# Patient Record
Sex: MALE | Race: WHITE | NOT HISPANIC OR LATINO | URBAN - METROPOLITAN AREA
[De-identification: names, ages, dates, MRNs, and addresses within clinical notes are randomized per-mention and may not be internally consistent; named-entity substitution may affect disease eponyms.]

---

## 2023-03-09 PROBLEM — E11.65 TYPE 2 DIABETES MELLITUS WITH HYPERGLYCEMIA, WITH LONG-TERM CURRENT USE OF INSULIN (MULTI): Status: ACTIVE | Noted: 2023-03-09

## 2023-03-09 PROBLEM — S12.9XXD COMPRESSION FRACTURE OF CERVICAL SPINE WITH ROUTINE HEALING: Status: ACTIVE | Noted: 2023-03-09

## 2023-03-09 PROBLEM — Z86.39 HISTORY OF UNCONTROLLED DIABETES: Status: ACTIVE | Noted: 2023-03-09

## 2023-03-09 PROBLEM — E83.52 HYPERCALCEMIA: Status: ACTIVE | Noted: 2023-03-09

## 2023-03-09 PROBLEM — F10.20 ALCOHOLISM (MULTI): Status: ACTIVE | Noted: 2023-03-09

## 2023-03-09 PROBLEM — R51.9 CEPHALGIA: Status: ACTIVE | Noted: 2023-03-09

## 2023-03-09 PROBLEM — F41.9 ANXIETY: Status: ACTIVE | Noted: 2023-03-09

## 2023-03-09 PROBLEM — E21.3 HYPERPARATHYROIDISM (MULTI): Status: ACTIVE | Noted: 2023-03-09

## 2023-03-09 PROBLEM — Z79.4 TYPE 2 DIABETES MELLITUS WITH HYPERGLYCEMIA, WITH LONG-TERM CURRENT USE OF INSULIN (MULTI): Status: ACTIVE | Noted: 2023-03-09

## 2023-03-09 PROBLEM — K70.10: Status: ACTIVE | Noted: 2023-03-09

## 2023-03-09 PROBLEM — S22.000D COMPRESSION FRACTURE OF THORACIC VERTEBRA WITH ROUTINE HEALING: Status: ACTIVE | Noted: 2023-03-09

## 2023-03-09 PROBLEM — R00.2 PALPITATIONS: Status: ACTIVE | Noted: 2023-03-09

## 2023-03-09 RX ORDER — PEN NEEDLE, DIABETIC 30 GX3/16"
NEEDLE, DISPOSABLE MISCELLANEOUS
COMMUNITY
End: 2023-04-06 | Stop reason: SDUPTHER

## 2023-03-09 RX ORDER — INSULIN LISPRO 100 [IU]/ML
INJECTION, SOLUTION SUBCUTANEOUS
COMMUNITY
Start: 2021-01-28 | End: 2023-03-10 | Stop reason: SDUPTHER

## 2023-03-09 RX ORDER — CALCIUM CITRATE/VITAMIN D3 200MG-6.25
TABLET ORAL
COMMUNITY
Start: 2020-09-23

## 2023-03-09 RX ORDER — INSULIN GLARGINE 100 [IU]/ML
INJECTION, SOLUTION SUBCUTANEOUS
COMMUNITY
Start: 2020-09-17 | End: 2023-03-10 | Stop reason: SDUPTHER

## 2023-03-10 ENCOUNTER — TELEPHONE (OUTPATIENT)
Dept: PRIMARY CARE | Facility: CLINIC | Age: 28
End: 2023-03-10
Payer: COMMERCIAL

## 2023-03-10 DIAGNOSIS — E10.9 TYPE 1 DIABETES MELLITUS WITHOUT COMPLICATION (MULTI): Primary | ICD-10-CM

## 2023-03-10 RX ORDER — INSULIN LISPRO 100 [IU]/ML
7 INJECTION, SOLUTION SUBCUTANEOUS
Qty: 2 ML | Refills: 0 | Status: SHIPPED | OUTPATIENT
Start: 2023-03-10 | End: 2023-06-30 | Stop reason: SDUPTHER

## 2023-03-10 RX ORDER — INSULIN GLARGINE 100 [IU]/ML
30 INJECTION, SOLUTION SUBCUTANEOUS NIGHTLY
Qty: 2 ML | Refills: 0 | Status: SHIPPED | OUTPATIENT
Start: 2023-03-10 | End: 2023-06-30 | Stop reason: SDUPTHER

## 2023-03-10 NOTE — TELEPHONE ENCOUNTER
Refill(s) requested for:     1) Insulin Lispro   2) Lantus (100 unit/mL)    Pharmacy: [facility]  Pharmacy City: [place]     LR: 08/23/2022  LV: 09/10/2021  NV: 03/13/2023    Just as an FYI...the last time this pt called for a refill, he was told he needed an apt. He scheduled it, got his medication and no showed his apt on 02/06/2023

## 2023-03-13 ENCOUNTER — OFFICE VISIT (OUTPATIENT)
Dept: PRIMARY CARE | Facility: CLINIC | Age: 28
End: 2023-03-13
Payer: COMMERCIAL

## 2023-03-13 VITALS
WEIGHT: 155 LBS | SYSTOLIC BLOOD PRESSURE: 132 MMHG | DIASTOLIC BLOOD PRESSURE: 82 MMHG | TEMPERATURE: 95.9 F | HEIGHT: 70 IN | BODY MASS INDEX: 22.19 KG/M2

## 2023-03-13 DIAGNOSIS — Z79.4 TYPE 2 DIABETES MELLITUS WITH HYPERGLYCEMIA, WITH LONG-TERM CURRENT USE OF INSULIN (MULTI): Primary | ICD-10-CM

## 2023-03-13 DIAGNOSIS — K70.10: ICD-10-CM

## 2023-03-13 DIAGNOSIS — E11.65 TYPE 2 DIABETES MELLITUS WITH HYPERGLYCEMIA, WITH LONG-TERM CURRENT USE OF INSULIN (MULTI): Primary | ICD-10-CM

## 2023-03-13 LAB
ALANINE AMINOTRANSFERASE (SGPT) (U/L) IN SER/PLAS: 77 U/L (ref 10–52)
ALBUMIN (G/DL) IN SER/PLAS: 4.8 G/DL (ref 3.4–5)
ALKALINE PHOSPHATASE (U/L) IN SER/PLAS: 267 U/L (ref 33–120)
AMYLASE (U/L) IN SER/PLAS: 32 U/L (ref 29–103)
ANION GAP IN SER/PLAS: 13 MMOL/L (ref 10–20)
ASPARTATE AMINOTRANSFERASE (SGOT) (U/L) IN SER/PLAS: 45 U/L (ref 9–39)
BILIRUBIN TOTAL (MG/DL) IN SER/PLAS: 0.7 MG/DL (ref 0–1.2)
CALCIDIOL (25 OH VITAMIN D3) (NG/ML) IN SER/PLAS: <7 NG/ML
CALCIUM (MG/DL) IN SER/PLAS: 11.6 MG/DL (ref 8.6–10.3)
CARBON DIOXIDE, TOTAL (MMOL/L) IN SER/PLAS: 26 MMOL/L (ref 21–32)
CHLORIDE (MMOL/L) IN SER/PLAS: 97 MMOL/L (ref 98–107)
CHOLESTEROL (MG/DL) IN SER/PLAS: 127 MG/DL (ref 0–199)
CHOLESTEROL IN HDL (MG/DL) IN SER/PLAS: 47.3 MG/DL
CHOLESTEROL/HDL RATIO: 2.7
CREATININE (MG/DL) IN SER/PLAS: 0.84 MG/DL (ref 0.5–1.3)
ESTIMATED AVERAGE GLUCOSE FOR HBA1C: 289 MG/DL
GFR MALE: >90 ML/MIN/1.73M2
GLUCOSE (MG/DL) IN SER/PLAS: 410 MG/DL (ref 74–99)
HEMOGLOBIN A1C/HEMOGLOBIN TOTAL IN BLOOD: 11.7 %
LDL: 64 MG/DL (ref 0–99)
LIPASE (U/L) IN SER/PLAS: 10 U/L (ref 9–82)
POTASSIUM (MMOL/L) IN SER/PLAS: 4.4 MMOL/L (ref 3.5–5.3)
PROTEIN TOTAL: 7 G/DL (ref 6.4–8.2)
SODIUM (MMOL/L) IN SER/PLAS: 132 MMOL/L (ref 136–145)
TRIGLYCERIDE (MG/DL) IN SER/PLAS: 81 MG/DL (ref 0–149)
UREA NITROGEN (MG/DL) IN SER/PLAS: 9 MG/DL (ref 6–23)
VLDL: 16 MG/DL (ref 0–40)

## 2023-03-13 PROCEDURE — 83036 HEMOGLOBIN GLYCOSYLATED A1C: CPT

## 2023-03-13 PROCEDURE — 3079F DIAST BP 80-89 MM HG: CPT | Performed by: FAMILY MEDICINE

## 2023-03-13 PROCEDURE — 82150 ASSAY OF AMYLASE: CPT

## 2023-03-13 PROCEDURE — 82306 VITAMIN D 25 HYDROXY: CPT

## 2023-03-13 PROCEDURE — 80061 LIPID PANEL: CPT

## 2023-03-13 PROCEDURE — 83690 ASSAY OF LIPASE: CPT

## 2023-03-13 PROCEDURE — 3075F SYST BP GE 130 - 139MM HG: CPT | Performed by: FAMILY MEDICINE

## 2023-03-13 PROCEDURE — 99214 OFFICE O/P EST MOD 30 MIN: CPT | Performed by: FAMILY MEDICINE

## 2023-03-13 PROCEDURE — 80053 COMPREHEN METABOLIC PANEL: CPT

## 2023-03-13 ASSESSMENT — ENCOUNTER SYMPTOMS
COUGH: 0
HYPERACTIVE: 1
DYSPHORIC MOOD: 0
PALPITATIONS: 0
FEVER: 0
CONSTIPATION: 0
DIZZINESS: 0
DIARRHEA: 0
SHORTNESS OF BREATH: 0
CONFUSION: 0
NAUSEA: 0
ABDOMINAL PAIN: 0
BLOOD IN STOOL: 0
MYALGIAS: 0
HEMATURIA: 0
BACK PAIN: 0
NUMBNESS: 0
CHILLS: 0
ARTHRALGIAS: 0
LIGHT-HEADEDNESS: 0
FATIGUE: 0
SORE THROAT: 0
DEPRESSION: 0
WEAKNESS: 0
DYSURIA: 0
VOMITING: 0
HEADACHES: 0

## 2023-03-13 ASSESSMENT — PATIENT HEALTH QUESTIONNAIRE - PHQ9
1. LITTLE INTEREST OR PLEASURE IN DOING THINGS: NOT AT ALL
2. FEELING DOWN, DEPRESSED OR HOPELESS: NOT AT ALL
SUM OF ALL RESPONSES TO PHQ9 QUESTIONS 1 AND 2: 0

## 2023-03-13 NOTE — PATIENT INSTRUCTIONS
I will order a series of labs to evaluate your diabetes.  I recommend avoiding sugars and starches in the diet.  You state your diet could be a lot better than it is now.  I will order a referral to an endocrinologist to try to help get better control of your diabetes.  It is excellent that you completely stopped drinking a few years ago.  It is excellent that you have not smoked in the last six months.  I recommend completely avoiding smoking.  Return in one month for a recheck.

## 2023-03-13 NOTE — PROGRESS NOTES
"Subjective   Patient ID: [id number]     Paxton Hargrove is a 27 y.o. male who presents for Med Refill.  HPI    He is here for a recheck.  He has insulin dependent diabetes that has been poorly controlled in the past.  He has not been seen here since Fall 2021.  He has a history of alcohol abuse in the past.      He has a history of cervical and thoracic fractures at C6, C7, T2 and T3 in 2020.  He has a history of pancreatitis in 2020.    He is taking 18 units of Lantus each night and 7 units of lispro three times per day.  He is checking his sugars and getting readings around 250 on average.      He states he no longer drinks alcohol at all.  He has not drank at all since the last time he was in.  He states he was drinking for the anxiety and he no longer feels anxious.  He states he does not feel depressed at all.      Review of Systems   Constitutional:  Negative for chills, fatigue and fever.   HENT:  Negative for congestion and sore throat.    Eyes:  Negative for visual disturbance.   Respiratory:  Negative for cough and shortness of breath.    Cardiovascular:  Negative for chest pain, palpitations and leg swelling.   Gastrointestinal:  Negative for abdominal pain, blood in stool, constipation, diarrhea, nausea and vomiting.        Still has the oily stool when he eats too much fat, he states.   Genitourinary:  Negative for dysuria and hematuria.   Musculoskeletal:  Negative for arthralgias, back pain and myalgias.   Neurological:  Negative for dizziness, weakness, light-headedness, numbness and headaches.   Psychiatric/Behavioral:  Negative for behavioral problems, confusion, dysphoric mood, self-injury and suicidal ideas. The patient is hyperactive (a little bit. not as bad as it was.).       Objective     /82 (BP Location: Right arm, Patient Position: Sitting)   Temp 35.5 °C (95.9 °F)   Ht 1.772 m (5' 9.75\")   Wt 70.3 kg (155 lb)   BMI 22.40 kg/m²      Physical Exam  Constitutional:       Appearance: " Normal appearance. He is normal weight.   Cardiovascular:      Rate and Rhythm: Normal rate and regular rhythm.      Pulses: Normal pulses.      Heart sounds: Normal heart sounds. No murmur heard.  Pulmonary:      Effort: Pulmonary effort is normal.      Breath sounds: Normal breath sounds.   Abdominal:      General: Abdomen is flat.      Palpations: Abdomen is soft.      Tenderness: There is no abdominal tenderness. There is no guarding.   Skin:     Coloration: Skin is not jaundiced.   Neurological:      Mental Status: He is alert.   Psychiatric:         Mood and Affect: Mood normal.         Assessment/Plan   Problem List Items Addressed This Visit          Endocrine/Metabolic    Type 2 diabetes mellitus with hyperglycemia, with long-term current use of insulin (CMS/HCC) - Primary    Relevant Orders    Comprehensive metabolic panel    Lipid Panel    Amylase    Lipase    Hemoglobin A1C    Vitamin D 25-Hydroxy,Total    Referral to Endocrinology       Infectious/Inflammatory    Chronic alcoholic hepatitis    Relevant Orders    Comprehensive metabolic panel    Lipid Panel    Amylase    Lipase    Vitamin D 25-Hydroxy,Total   I will order a series of labs to evaluate your diabetes.  I recommend avoiding sugars and starches in the diet.  You state your diet could be a lot better than it is now.  I will order a referral to an endocrinologist to try to help get better control of your diabetes.  It is excellent that you completely stopped drinking a few years ago.  It is excellent that you have not smoked in the last six months.  I recommend completely avoiding smoking.  Return in one month for a recheck.      Casey Pacheco, DO

## 2023-03-14 ENCOUNTER — TELEPHONE (OUTPATIENT)
Dept: PRIMARY CARE | Facility: CLINIC | Age: 28
End: 2023-03-14
Payer: COMMERCIAL

## 2023-03-14 NOTE — TELEPHONE ENCOUNTER
Per -Patient notified that 5000 units daily of Vit D is appropriate to take.    Lab results discussed with patient.  Vitamin D level is low.  Patient is asking if Vitamin D 5000 units daily is too high of a dose to take?  He has 5000 unit pills currently at home that he would like to start if okay with you?  Please advise.  Thanks      ----- Message from Casey Pacheco DO sent at 3/14/2023  2:28 PM EDT -----  Please let him know his labs showed that his diabetes is not well controlled but it is better than it was two years ago.  I recommend that he continue his insulin and take it regularly.  I recommend that he strictly avoid sugars and starches from his diet.  Return as recommended in one month.

## 2023-04-06 DIAGNOSIS — E10.9 TYPE 1 DIABETES MELLITUS WITHOUT COMPLICATION (MULTI): Primary | ICD-10-CM

## 2023-04-06 RX ORDER — PEN NEEDLE, DIABETIC 30 GX3/16"
1 NEEDLE, DISPOSABLE MISCELLANEOUS 3 TIMES DAILY
Qty: 100 EACH | Refills: 2 | Status: SHIPPED | OUTPATIENT
Start: 2023-04-06 | End: 2023-07-31

## 2023-04-06 NOTE — TELEPHONE ENCOUNTER
Refill(s) requested for: Pen needles (31 G x 81 MM)    Pharmacy: [facility]  Pharmacy Address: [id number] [place] [address]    LR: 01/18/2023  LV: 03/13/2023  NV: None

## 2023-06-30 DIAGNOSIS — E10.9 TYPE 1 DIABETES MELLITUS WITHOUT COMPLICATION (MULTI): ICD-10-CM

## 2023-06-30 RX ORDER — INSULIN LISPRO 100 [IU]/ML
7 INJECTION, SOLUTION SUBCUTANEOUS
Qty: 2 ML | Refills: 0 | Status: SHIPPED | OUTPATIENT
Start: 2023-06-30 | End: 2023-10-31 | Stop reason: SDUPTHER

## 2023-06-30 RX ORDER — INSULIN GLARGINE 100 [IU]/ML
30 INJECTION, SOLUTION SUBCUTANEOUS NIGHTLY
Qty: 2 ML | Refills: 0 | Status: SHIPPED | OUTPATIENT
Start: 2023-06-30 | End: 2023-10-27 | Stop reason: SDUPTHER

## 2023-06-30 NOTE — TELEPHONE ENCOUNTER
Pt is requesting a refill on    Lantus 100 units 30 units nightly  LR 3/10/2023    Humalog 100 mg 7 units tid with meals  LR 3/10/2023     [facility]  [phone]    LV  3/10/2023  OLYAA

## 2023-07-30 DIAGNOSIS — E10.9 TYPE 1 DIABETES MELLITUS WITHOUT COMPLICATION (MULTI): ICD-10-CM

## 2023-07-31 ENCOUNTER — TELEPHONE (OUTPATIENT)
Dept: PRIMARY CARE | Facility: CLINIC | Age: 28
End: 2023-07-31
Payer: COMMERCIAL

## 2023-07-31 RX ORDER — PEN NEEDLE, DIABETIC 31 GX5/16"
NEEDLE, DISPOSABLE MISCELLANEOUS
Qty: 30 EACH | Refills: 2 | Status: SHIPPED | OUTPATIENT
Start: 2023-07-31 | End: 2023-08-07 | Stop reason: SDUPTHER

## 2023-07-31 NOTE — TELEPHONE ENCOUNTER
"THIS MESSAGE NEEDS TO BE SENT DIRECTLY TO     REFILL REQUEST    Med: Pen needle  Med Dose: 31 G x 5/16\"  Med Frequency: three times daily    [facility]  Pharmacy Address: [id number] [facility] [address] in [place]    LR: 04/06/2023  LV: 03/13/2023  NV: None    REFUSED to schedule apt. He was told at last apt (3/13) to follow up in a month, he did not. When I tried to get him an apt scheduled..he said it is his health, we should not have an incentive to try and get him to come in, he hates coming to these apts, and he does not feel he needs to be seen. He said all his meds/supplies should still be filled because he still has a medical condition. I explained that pts with diabetes need to come in frequently to check in on their health and he said that it is his health and he does not need to be seen frequently.   "

## 2023-08-07 DIAGNOSIS — E10.9 TYPE 1 DIABETES MELLITUS WITHOUT COMPLICATION (MULTI): ICD-10-CM

## 2023-08-07 RX ORDER — PEN NEEDLE, DIABETIC 30 GX3/16"
NEEDLE, DISPOSABLE MISCELLANEOUS
Qty: 90 EACH | Refills: 2 | Status: SHIPPED | OUTPATIENT
Start: 2023-08-07 | End: 2023-09-11 | Stop reason: SDUPTHER

## 2023-09-11 DIAGNOSIS — E10.9 TYPE 1 DIABETES MELLITUS WITHOUT COMPLICATION (MULTI): ICD-10-CM

## 2023-09-11 RX ORDER — PEN NEEDLE, DIABETIC 30 GX3/16"
NEEDLE, DISPOSABLE MISCELLANEOUS
Qty: 90 EACH | Refills: 2 | Status: SHIPPED | OUTPATIENT
Start: 2023-09-11 | End: 2023-10-31 | Stop reason: SDUPTHER

## 2023-09-11 NOTE — TELEPHONE ENCOUNTER
"Pt requesting refill    Pen needle 31 gauge x 5/16\" per chart use 1 in the morning, 1 in the afternoon, 1 before bedtime, per Pt use 4 times daily   [phone]   Last refill 8/7/23  Last appt 3/13/23  No future appt has been scheduled   "

## 2023-10-27 DIAGNOSIS — E10.9 TYPE 1 DIABETES MELLITUS WITHOUT COMPLICATION (MULTI): ICD-10-CM

## 2023-10-27 RX ORDER — INSULIN GLARGINE 100 [IU]/ML
30 INJECTION, SOLUTION SUBCUTANEOUS NIGHTLY
Qty: 10 ML | Refills: 0 | Status: SHIPPED | OUTPATIENT
Start: 2023-10-27 | End: 2023-10-31 | Stop reason: SDUPTHER

## 2023-10-27 NOTE — TELEPHONE ENCOUNTER
REFILL REQUEST    Med: Lantus  Med Dose: 100 unit/mL  Med Frequency: inject 30 units under the skin daily     [facility]  [facility]armacy Address: [id number] [facility] in [place]    LR: 06/30/2023  LV: 03/13/2023  NV: 10/31/2023

## 2023-10-31 ENCOUNTER — OFFICE VISIT (OUTPATIENT)
Dept: PRIMARY CARE | Facility: CLINIC | Age: 28
End: 2023-10-31
Payer: COMMERCIAL

## 2023-10-31 VITALS
SYSTOLIC BLOOD PRESSURE: 134 MMHG | TEMPERATURE: 97.6 F | BODY MASS INDEX: 21.68 KG/M2 | WEIGHT: 150 LBS | DIASTOLIC BLOOD PRESSURE: 80 MMHG

## 2023-10-31 DIAGNOSIS — E10.9 TYPE 1 DIABETES MELLITUS WITHOUT COMPLICATION (MULTI): Primary | ICD-10-CM

## 2023-10-31 LAB
POC FINGERSTICK BLOOD GLUCOSE: 455 MG/DL (ref 70–100)
POC HEMOGLOBIN A1C: 13.2 % (ref 4.2–6.5)

## 2023-10-31 PROCEDURE — 99214 OFFICE O/P EST MOD 30 MIN: CPT | Performed by: FAMILY MEDICINE

## 2023-10-31 PROCEDURE — 82962 GLUCOSE BLOOD TEST: CPT | Performed by: FAMILY MEDICINE

## 2023-10-31 PROCEDURE — 3079F DIAST BP 80-89 MM HG: CPT | Performed by: FAMILY MEDICINE

## 2023-10-31 PROCEDURE — 83036 HEMOGLOBIN GLYCOSYLATED A1C: CPT | Performed by: FAMILY MEDICINE

## 2023-10-31 PROCEDURE — 3046F HEMOGLOBIN A1C LEVEL >9.0%: CPT | Performed by: FAMILY MEDICINE

## 2023-10-31 PROCEDURE — 3075F SYST BP GE 130 - 139MM HG: CPT | Performed by: FAMILY MEDICINE

## 2023-10-31 RX ORDER — INSULIN LISPRO 100 [IU]/ML
7 INJECTION, SOLUTION SUBCUTANEOUS
Qty: 2 ML | Refills: 0 | Status: SHIPPED | OUTPATIENT
Start: 2023-10-31 | End: 2023-12-07 | Stop reason: SDUPTHER

## 2023-10-31 RX ORDER — INSULIN GLARGINE 100 [IU]/ML
INJECTION, SOLUTION SUBCUTANEOUS
Qty: 10 ML | Refills: 2 | Status: SHIPPED | OUTPATIENT
Start: 2023-10-31 | End: 2023-12-07 | Stop reason: SDUPTHER

## 2023-10-31 RX ORDER — PEN NEEDLE, DIABETIC 30 GX3/16"
NEEDLE, DISPOSABLE MISCELLANEOUS
Qty: 90 EACH | Refills: 2 | Status: SHIPPED | OUTPATIENT
Start: 2023-10-31 | End: 2023-12-07 | Stop reason: SDUPTHER

## 2023-10-31 ASSESSMENT — PATIENT HEALTH QUESTIONNAIRE - PHQ9
2. FEELING DOWN, DEPRESSED OR HOPELESS: NOT AT ALL
SUM OF ALL RESPONSES TO PHQ9 QUESTIONS 1 AND 2: 0
1. LITTLE INTEREST OR PLEASURE IN DOING THINGS: NOT AT ALL

## 2023-10-31 ASSESSMENT — ENCOUNTER SYMPTOMS: DEPRESSION: 0

## 2023-10-31 NOTE — PROGRESS NOTES
Subjective   Patient ID: [id number]     Paxton Hargrove is a 28 y.o. male who presents for Follow-up and Med Refill.  HPI  He is here for a recheck on diabetes.      He is on Lantus 30 units nightly and Humalog 7 units, three times per day with meals.  His last A1c was 11.7 in March 2023.    He is taking the insulin every day.  He states he does not checking his glucose.     No jitteriness, nausea, headache or dizziness.      No chest pain or sob.      He denies depression or anxiety.  No SI.      He does not smoke.  No alcohol.  No drug use.    He is not avoiding sugary foods.    Objective     /80 (BP Location: Right arm, Patient Position: Sitting)   Temp 36.4 °C (97.6 °F) (Skin)   Wt 68 kg (150 lb)   BMI 21.68 kg/m²      Physical Exam  Constitutional:       General: He is not in acute distress.     Appearance: Normal appearance.   Cardiovascular:      Rate and Rhythm: Normal rate and regular rhythm.      Heart sounds: Normal heart sounds.   Pulmonary:      Effort: Pulmonary effort is normal.      Breath sounds: Normal breath sounds.   Abdominal:      General: Abdomen is flat.      Palpations: Abdomen is soft.      Tenderness: There is no abdominal tenderness.   Neurological:      General: No focal deficit present.      Mental Status: He is alert and oriented to person, place, and time.      Sensory: No sensory deficit.      Coordination: Coordination normal.      Gait: Gait normal.   Psychiatric:         Mood and Affect: Mood normal.         Assessment/Plan   Problem List Items Addressed This Visit    None  Visit Diagnoses       Type 1 diabetes mellitus without complication (CMS/[facility])    -  Primary    Relevant Medications    insulin glargine (Lantus U-100 Insulin) 100 unit/mL injection    Other Relevant Orders    POCT Fingerstick Glucose manually resulted (Completed)    POCT glycosylated hemoglobin (Hb A1C) manually resulted (Completed)    Referral to Endocrinology          Your glucose is very poorly  controlled.  I will refer you to a diabetes specialist.  I recommend avoiding foods and drinks high in sugars and starches.   I recommend increasing your Lantus to 40 units per night.  I recommend checking your glucose frequency, especially because you are on insulin.  Return in one month for a recheck and sooner if there are any problems.  Casey Pacheco, DO

## 2023-10-31 NOTE — PATIENT INSTRUCTIONS
Your glucose is very poorly controlled.  I will refer you to a diabetes specialist.  I recommend avoiding foods and drinks high in sugars and starches.   I recommend increasing your Lantus to 40 units per night.  I recommend checking your glucose frequency, especially because you are on insulin.  Return in one month for a recheck and sooner if there are any problems.

## 2023-12-07 DIAGNOSIS — E10.9 TYPE 1 DIABETES MELLITUS WITHOUT COMPLICATION (MULTI): ICD-10-CM

## 2023-12-07 RX ORDER — INSULIN GLARGINE 100 [IU]/ML
INJECTION, SOLUTION SUBCUTANEOUS
Qty: 10 ML | Refills: 2 | Status: SHIPPED | OUTPATIENT
Start: 2023-12-07

## 2023-12-07 RX ORDER — INSULIN LISPRO 100 [IU]/ML
7 INJECTION, SOLUTION SUBCUTANEOUS
Qty: 2 ML | Refills: 0 | Status: SHIPPED | OUTPATIENT
Start: 2023-12-07

## 2023-12-07 RX ORDER — PEN NEEDLE, DIABETIC 30 GX3/16"
NEEDLE, DISPOSABLE MISCELLANEOUS
Qty: 90 EACH | Refills: 2 | Status: SHIPPED | OUTPATIENT
Start: 2023-12-07

## 2023-12-07 NOTE — TELEPHONE ENCOUNTER
"REFILL  MEDICATION:     Insulin Glargine 100 Unit/ML Injection; Inject 40 units daily.     Insulin Lispro 100 Unit/ML Injection; Inject 7 units under the skin 3 times a day with meals.     Pen Needle, diabetic 31 gauge * 5/16\" needle; 1 each in the morning and 1 each in the evening and 1 each before bedtime.     [facility]  PHARM NUMBER: [phone]    LR: 10-31-23   LV: 10-31-23  NV: 1-4-24 VV   "

## 2024-01-31 DIAGNOSIS — E10.9 TYPE 1 DIABETES MELLITUS WITHOUT COMPLICATION (MULTI): ICD-10-CM

## 2024-02-01 RX ORDER — INSULIN LISPRO 100 [IU]/ML
INJECTION, SOLUTION INTRAVENOUS; SUBCUTANEOUS
Qty: 3 ML | Refills: 0 | Status: SHIPPED | OUTPATIENT
Start: 2024-02-01

## 2024-07-26 ENCOUNTER — OFFICE VISIT (OUTPATIENT)
Dept: PRIMARY CARE | Facility: CLINIC | Age: 29
End: 2024-07-26
Payer: COMMERCIAL

## 2024-07-26 VITALS — BODY MASS INDEX: 17.34 KG/M2 | WEIGHT: 120 LBS | DIASTOLIC BLOOD PRESSURE: 80 MMHG | SYSTOLIC BLOOD PRESSURE: 120 MMHG

## 2024-07-26 DIAGNOSIS — E10.9 TYPE 1 DIABETES MELLITUS WITHOUT COMPLICATION (MULTI): Primary | ICD-10-CM

## 2024-07-26 PROCEDURE — 3074F SYST BP LT 130 MM HG: CPT | Performed by: FAMILY MEDICINE

## 2024-07-26 PROCEDURE — 3079F DIAST BP 80-89 MM HG: CPT | Performed by: FAMILY MEDICINE

## 2024-07-26 PROCEDURE — 99214 OFFICE O/P EST MOD 30 MIN: CPT | Performed by: FAMILY MEDICINE

## 2024-07-26 NOTE — PROGRESS NOTES
"Subjective   Patient ID: [id number]     Paxton Hargrove is a 29 y.o. male who presents for Diabetes follow up.  HPI  He is here for a recheck.  He has type one diabetes.      He is on humalog KwikPen 7 units three times daily.  He is on Lantus insulin 40 units at bedtime.    He has lost significant weight--thirty pounds since October.  BMI is 17.    He has been cutting back on carbs.  He is eating better.  Less carbs lately.    Denies drinking alcohol.    He checks his glucose twice daily.  Gets 200-300.  250 is the average.    He feels well in general.  He complains of anxiety and stress.    He has anxiety if \"I am forced to be somewhere\".  He is requesting a note off jury duty.   He has bad anxiety.  He needs to be able to eat when he wants and he cannot do that if he is serving on jury duty.      Juror [id number]      Objective     /80   Wt 54.4 kg (120 lb)   BMI 17.34 kg/m²      Physical Exam  Constitutional:       General: He is not in acute distress.     Appearance: Normal appearance. He is not ill-appearing or toxic-appearing.      Comments: Thin, cachectic.  Comfortable.    Cardiovascular:      Rate and Rhythm: Normal rate and regular rhythm.      Heart sounds: Normal heart sounds. No murmur heard.  Pulmonary:      Effort: Pulmonary effort is normal. No respiratory distress.      Breath sounds: Normal breath sounds.   Neurological:      General: No focal deficit present.      Mental Status: He is alert and oriented to person, place, and time.   Psychiatric:      Comments: Anxious affect.         Assessment/Plan   Problem List Items Addressed This Visit    None  Visit Diagnoses       Type 1 diabetes mellitus without complication (Multi)    -  Primary    Relevant Orders    Urinalysis with Reflex Microscopic    Thyroid Stimulating Hormone    Hemoglobin A1C    Lipid Panel    Comprehensive Metabolic Panel    CBC and Auto Differential    Albumin-Creatinine Ratio, Urine Random    Referral to Endocrinology      "   I ordered labs to be done at [address]  I will write a letter excusing you from jury duty.     I recommend drinking plenty of water.  I recommend an endocrinology referral.  Return in three months for a recheck.     Casey Pacheco, DO

## 2024-07-26 NOTE — PATIENT INSTRUCTIONS
I ordered labs to be done at [address]  I will write a letter excusing you from jury duty.     I recommend drinking plenty of water.  I recommend an endocrinology referral.  Return in three months for a recheck.

## 2024-07-26 NOTE — LETTER
July 26, 2024     Patient: Paxton Hargrove   YOB: 1995   Date of Visit: 7/26/2024   Juror #[id number]    To Whom It May Concern:    Paxton Hargrove was seen in my clinic on 7/26/2024 at 2:20 pm. Paxton has severely uncontrolled blood sugar issues and other health problems.  He is unable to serve as a juror for medical reasons.      Sincerely,        Casey Pacheco DO         Sincerely,         Casey Pacheco DO        CC: No Recipients

## 2024-07-29 ENCOUNTER — TELEPHONE (OUTPATIENT)
Dept: PRIMARY CARE | Facility: CLINIC | Age: 29
End: 2024-07-29
Payer: COMMERCIAL

## 2024-07-29 DIAGNOSIS — E10.9 TYPE 1 DIABETES MELLITUS WITHOUT COMPLICATION (MULTI): ICD-10-CM

## 2024-07-29 RX ORDER — PEN NEEDLE, DIABETIC 30 GX3/16"
NEEDLE, DISPOSABLE MISCELLANEOUS
Qty: 90 EACH | Refills: 2 | Status: SHIPPED | OUTPATIENT
Start: 2024-07-29

## 2024-07-29 RX ORDER — INSULIN LISPRO 100 [IU]/ML
7 INJECTION, SOLUTION SUBCUTANEOUS
Qty: 2 ML | Refills: 0 | Status: SHIPPED | OUTPATIENT
Start: 2024-07-29

## 2024-07-29 RX ORDER — INSULIN GLARGINE 100 [IU]/ML
INJECTION, SOLUTION SUBCUTANEOUS
Qty: 10 ML | Refills: 2 | Status: SHIPPED | OUTPATIENT
Start: 2024-07-29

## 2024-07-29 RX ORDER — CALCIUM CITRATE/VITAMIN D3 200MG-6.25
1 TABLET ORAL 3 TIMES DAILY PRN
Qty: 100 STRIP | Refills: 1 | Status: SHIPPED | OUTPATIENT
Start: 2024-07-29

## 2024-07-29 NOTE — TELEPHONE ENCOUNTER
Pt was seen on 7/26/24 for his refills but pt stated that none of his medications were sent. Pt is asking if you can send these in?    REFILL  MEDICATION:     Insulin Lispro 100 unit/ML; Inject 7 units under the skin 3 times a day with meals.    LR: 2/1/24      3 ML with 0 refills     Insulin Glargine 100 unit/ML Injection; Inject 40 units daily.    LR: 12/7/23      10 ML with 2 refills     Pen Needle, diabetic 31 gauge *5/16 needle; 1 each in the morning and 1 each in the evening and 1 each before bedtime.     LR: 12/7/23        90 each with 2 refills     Blood Sugar Diagnostic Strip; Test QID.    LR: 1/18/23     1*100 box with 1 refill    [facility]  [facility] NUMBER: [phone]    LV: 7/26/24  NV: No future appt.

## 2024-09-17 ENCOUNTER — TELEPHONE (OUTPATIENT)
Dept: PRIMARY CARE | Facility: CLINIC | Age: 29
End: 2024-09-17
Payer: COMMERCIAL

## 2024-09-17 DIAGNOSIS — E10.9 TYPE 1 DIABETES MELLITUS WITHOUT COMPLICATION: ICD-10-CM

## 2024-09-17 RX ORDER — INSULIN GLARGINE 100 [IU]/ML
INJECTION, SOLUTION SUBCUTANEOUS
Qty: 10 ML | Refills: 2 | Status: SHIPPED | OUTPATIENT
Start: 2024-09-17

## 2024-09-17 RX ORDER — INSULIN LISPRO 100 [IU]/ML
7 INJECTION, SOLUTION SUBCUTANEOUS
Qty: 2 ML | Refills: 0 | Status: SHIPPED | OUTPATIENT
Start: 2024-09-17

## 2024-09-17 RX ORDER — PEN NEEDLE, DIABETIC 30 GX3/16"
NEEDLE, DISPOSABLE MISCELLANEOUS
Qty: 90 EACH | Refills: 2 | Status: SHIPPED | OUTPATIENT
Start: 2024-09-17

## 2024-09-17 RX ORDER — CALCIUM CITRATE/VITAMIN D3 200MG-6.25
1 TABLET ORAL 3 TIMES DAILY PRN
Qty: 100 STRIP | Refills: 1 | Status: SHIPPED | OUTPATIENT
Start: 2024-09-17

## 2024-09-17 NOTE — TELEPHONE ENCOUNTER
Pt also stated that the directions on his pen needles is to use 1 three times a day. Pt states that he uses it four times a day because he has two different types of insulin. Pt is asking if that can be change?     REFILL  MEDICATION:      Insulin Lispro 100 unit/ML; Inject 7 units under the skin 3 times a day with meals.     LR: 7/29/24      2 ML with 0 refills      Insulin Glargine 100 unit/ML Injection; Inject 40 units daily.     LR: 7/29/24      10 ML with 2 refills      Pen Needle, diabetic 31 gauge *5/16 needle; 1 each in the morning and 1 each in the evening and 1 each before bedtime.      LR: 7/29/24        90 each with 2 refills      Blood Sugar Diagnostic Strip; Test QID.     LR: 7/29/24     1*100 box with 1 refill     [facility]  PHARM NUMBER: [phone]     LV: 7/26/24  NV: No future appt.

## 2024-10-19 DIAGNOSIS — E10.9 TYPE 1 DIABETES MELLITUS WITHOUT COMPLICATION: ICD-10-CM

## 2024-10-21 RX ORDER — INSULIN LISPRO 100 [IU]/ML
INJECTION, SOLUTION INTRAVENOUS; SUBCUTANEOUS
Qty: 15 ML | Refills: 1 | Status: SHIPPED | OUTPATIENT
Start: 2024-10-21

## 2025-02-07 DIAGNOSIS — E10.9 TYPE 1 DIABETES MELLITUS WITHOUT COMPLICATION: ICD-10-CM

## 2025-02-10 RX ORDER — PEN NEEDLE, DIABETIC 30 GX3/16"
NEEDLE, DISPOSABLE MISCELLANEOUS
Qty: 100 EACH | Refills: 2 | Status: SHIPPED | OUTPATIENT
Start: 2025-02-10

## 2025-03-13 ENCOUNTER — TELEPHONE (OUTPATIENT)
Dept: PRIMARY CARE | Facility: CLINIC | Age: 30
End: 2025-03-13
Payer: COMMERCIAL

## 2025-03-13 DIAGNOSIS — E10.9 TYPE 1 DIABETES MELLITUS WITHOUT COMPLICATION: ICD-10-CM

## 2025-03-13 RX ORDER — PEN NEEDLE, DIABETIC 30 GX3/16"
NEEDLE, DISPOSABLE MISCELLANEOUS
Qty: 100 EACH | Refills: 2 | Status: SHIPPED | OUTPATIENT
Start: 2025-03-13

## 2025-03-13 RX ORDER — INSULIN LISPRO 100 [IU]/ML
INJECTION, SOLUTION INTRAVENOUS; SUBCUTANEOUS
Qty: 15 ML | Refills: 1 | Status: CANCELLED | OUTPATIENT
Start: 2025-03-13

## 2025-03-13 RX ORDER — INSULIN LISPRO 100 [IU]/ML
7 INJECTION, SOLUTION SUBCUTANEOUS
Qty: 2 ML | Refills: 0 | Status: SHIPPED | OUTPATIENT
Start: 2025-03-13

## 2025-03-13 RX ORDER — INSULIN GLARGINE 100 [IU]/ML
INJECTION, SOLUTION SUBCUTANEOUS
Qty: 10 ML | Refills: 2 | Status: SHIPPED | OUTPATIENT
Start: 2025-03-13

## 2025-03-13 NOTE — TELEPHONE ENCOUNTER
"Pt is overdue for his 3 month follow up from his July appointment.     REFILL  MEDICATION:     Insulin lispro (HumaLog) 100 unit/ML injection; Inject 7 units under the skin 3 times daily with meals (morning, midday, late afternoon).    LR: 10/21/24        15 ML with 1 refill     Insulin Glargine (Lantus U-100 Insulin) 100 unit/ML Injection; Inject 40 units daily.     LR: 9/17/24      10 ML with 2 refills     Pen needle, diabetic (BD Ultra-Fine Short Pen Needle) 31 gauge * 5/16\" needle; 1 each in the morning and 1 each in the evening and 1 each before bedtime.    LR: 2/10/25       100 each with 2 refills     [facility]   [facility]ARM NUMBER: [phone]    LV: 7/26/24  NV: No future appt.        (Tried to call pt but VM box is full)  "

## 2025-05-19 DIAGNOSIS — E10.9 TYPE 1 DIABETES MELLITUS WITHOUT COMPLICATION: ICD-10-CM

## 2025-05-20 RX ORDER — INSULIN GLARGINE 100 [IU]/ML
40 INJECTION, SOLUTION SUBCUTANEOUS DAILY
Qty: 45 ML | Refills: 1 | Status: SHIPPED | OUTPATIENT
Start: 2025-05-20

## 2025-06-30 DIAGNOSIS — E10.9 TYPE 1 DIABETES MELLITUS WITHOUT COMPLICATION: ICD-10-CM

## 2025-06-30 RX ORDER — INSULIN GLARGINE 100 [IU]/ML
40 INJECTION, SOLUTION SUBCUTANEOUS DAILY
Qty: 45 ML | Refills: 0 | Status: SHIPPED | OUTPATIENT
Start: 2025-06-30

## 2025-06-30 RX ORDER — PEN NEEDLE, DIABETIC 30 GX3/16"
NEEDLE, DISPOSABLE MISCELLANEOUS
Qty: 90 EACH | Refills: 0 | Status: SHIPPED | OUTPATIENT
Start: 2025-06-30

## 2025-06-30 RX ORDER — INSULIN LISPRO 100 [IU]/ML
7 INJECTION, SOLUTION SUBCUTANEOUS
Qty: 2 ML | Refills: 0 | Status: SHIPPED | OUTPATIENT
Start: 2025-06-30

## 2025-06-30 NOTE — TELEPHONE ENCOUNTER
"Pt is overdue for his 3 month follow up from his July appointment.      REFILL  MEDICATION:      Insulin lispro (HumaLog) 100 unit/ML injection; Inject 7 units under the skin 3 times daily with meals (morning, midday, late afternoon).     LR: 3/13/25        2 ML with 0 refill      Insulin Glargine (Lantus U-100 Insulin) 100 unit/ML Injection; Inject 40 units daily.      LR: 5/20/25      45 ML with 1 refills      Pen needle, diabetic (BD Ultra-Fine Short Pen Needle) 31 gauge * 5/16\" needle; 1 each in the morning and 1 each in the evening and 1 each before bedtime.  (Pt states that he uses his pen needles 4 times a day)     LR: 3/13/25       100 each with 2 refills      [facility]   [facility]ARM NUMBER: [phone]     LV: 7/26/24  NV: No future appt.        (Tried to call pt but VM box is full)  "

## 2025-07-07 DIAGNOSIS — E10.9 TYPE 1 DIABETES MELLITUS WITHOUT COMPLICATION: ICD-10-CM

## 2025-07-07 RX ORDER — PEN NEEDLE, DIABETIC 30 GX3/16"
NEEDLE, DISPOSABLE MISCELLANEOUS
Qty: 100 EACH | Refills: 0 | Status: SHIPPED | OUTPATIENT
Start: 2025-07-07